# Patient Record
Sex: FEMALE | Race: BLACK OR AFRICAN AMERICAN | Employment: UNEMPLOYED | URBAN - METROPOLITAN AREA
[De-identification: names, ages, dates, MRNs, and addresses within clinical notes are randomized per-mention and may not be internally consistent; named-entity substitution may affect disease eponyms.]

---
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
## 2025-01-09 ENCOUNTER — HOSPITAL ENCOUNTER (EMERGENCY)
Facility: HOSPITAL | Age: 20
Discharge: HOME | End: 2025-01-09
Payer: COMMERCIAL

## 2025-01-09 VITALS
HEIGHT: 59 IN | TEMPERATURE: 98.2 F | SYSTOLIC BLOOD PRESSURE: 130 MMHG | HEART RATE: 76 BPM | DIASTOLIC BLOOD PRESSURE: 76 MMHG | WEIGHT: 120 LBS | OXYGEN SATURATION: 100 % | RESPIRATION RATE: 16 BRPM | BODY MASS INDEX: 24.19 KG/M2
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
## 2025-01-09 PROCEDURE — 99282 EMERGENCY DEPT VISIT SF MDM: CPT

## 2025-01-09 PROCEDURE — 2500000001 HC RX 250 WO HCPCS SELF ADMINISTERED DRUGS (ALT 637 FOR MEDICARE OP): Mod: SE

## 2025-01-09 PROCEDURE — 99284 EMERGENCY DEPT VISIT MOD MDM: CPT

## 2025-01-09 PROCEDURE — 99283 EMERGENCY DEPT VISIT LOW MDM: CPT

## 2025-01-09 RX ORDER — ACETAMINOPHEN 325 MG/1
650 TABLET ORAL ONCE
Status: COMPLETED | OUTPATIENT
Start: 2025-01-09 | End: 2025-01-09

## 2025-01-09 RX ORDER — IBUPROFEN 600 MG/1
600 TABLET ORAL ONCE
Status: COMPLETED | OUTPATIENT
Start: 2025-01-09 | End: 2025-01-09

## 2025-01-09 RX ADMIN — IBUPROFEN 600 MG: 600 TABLET, FILM COATED ORAL at 14:47

## 2025-01-09 RX ADMIN — ACETAMINOPHEN 650 MG: 325 TABLET ORAL at 14:47

## 2025-01-09 ASSESSMENT — COLUMBIA-SUICIDE SEVERITY RATING SCALE - C-SSRS
6. HAVE YOU EVER DONE ANYTHING, STARTED TO DO ANYTHING, OR PREPARED TO DO ANYTHING TO END YOUR LIFE?: NO
2. HAVE YOU ACTUALLY HAD ANY THOUGHTS OF KILLING YOURSELF?: NO
1. IN THE PAST MONTH, HAVE YOU WISHED YOU WERE DEAD OR WISHED YOU COULD GO TO SLEEP AND NOT WAKE UP?: NO
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
## 2025-01-09 NOTE — Clinical Note
Shannon Yu was seen and treated in our emergency department on 1/9/2025.  She may return to school on 01/13/2025.      If you have any questions or concerns, please don't hesitate to call.      Heaven Haddad PA-C

## 2025-01-09 NOTE — DISCHARGE INSTRUCTIONS
Your facial injuries are very minor and should heal up on their own very nicely throughout the coming week.  You can apply ice to the bruised/swollen areas a few times per day.  Put the ice on for 20 minutes followed by 20 minutes off.  In addition, you can take Tylenol or Motrin/Advil every 6 hours as needed for any kind of pain.

## 2025-01-13 ENCOUNTER — HOSPITAL ENCOUNTER (EMERGENCY)
Facility: HOSPITAL | Age: 20
Discharge: HOME | End: 2025-01-13
Payer: COMMERCIAL

## 2025-01-13 ENCOUNTER — CLINICAL SUPPORT (OUTPATIENT)
Dept: EMERGENCY MEDICINE | Facility: HOSPITAL | Age: 20
End: 2025-01-13
Payer: COMMERCIAL

## 2025-01-13 VITALS
BODY MASS INDEX: 23.59 KG/M2 | TEMPERATURE: 99.7 F | OXYGEN SATURATION: 99 % | HEART RATE: 99 BPM | RESPIRATION RATE: 18 BRPM | WEIGHT: 117 LBS | HEIGHT: 59 IN

## 2025-01-13 DIAGNOSIS — R68.89 FLU-LIKE SYMPTOMS: Primary | ICD-10-CM

## 2025-01-13 LAB
ATRIAL RATE: 103 BPM
FLUAV RNA RESP QL NAA+PROBE: DETECTED
FLUBV RNA RESP QL NAA+PROBE: NOT DETECTED
P AXIS: 69 DEGREES
P OFFSET: 204 MS
P ONSET: 167 MS
PR INTERVAL: 130 MS
Q ONSET: 222 MS
QRS COUNT: 17 BEATS
QRS DURATION: 70 MS
QT INTERVAL: 314 MS
QTC CALCULATION(BAZETT): 411 MS
QTC FREDERICIA: 376 MS
R AXIS: 85 DEGREES
RSV RNA RESP QL NAA+PROBE: NOT DETECTED
SARS-COV-2 RNA RESP QL NAA+PROBE: NOT DETECTED
T AXIS: -17 DEGREES
T OFFSET: 379 MS
VENTRICULAR RATE: 103 BPM

## 2025-01-13 PROCEDURE — 99284 EMERGENCY DEPT VISIT MOD MDM: CPT

## 2025-01-13 PROCEDURE — 93005 ELECTROCARDIOGRAM TRACING: CPT

## 2025-01-13 PROCEDURE — 87637 SARSCOV2&INF A&B&RSV AMP PRB: CPT

## 2025-01-13 PROCEDURE — 2500000001 HC RX 250 WO HCPCS SELF ADMINISTERED DRUGS (ALT 637 FOR MEDICARE OP): Mod: SE

## 2025-01-13 RX ORDER — IBUPROFEN 400 MG/1
400 TABLET ORAL ONCE
Status: COMPLETED | OUTPATIENT
Start: 2025-01-13 | End: 2025-01-13

## 2025-01-13 RX ORDER — GUAIFENESIN 600 MG/1
1200 TABLET, EXTENDED RELEASE ORAL 2 TIMES DAILY
Qty: 16 TABLET | Refills: 0 | Status: SHIPPED | OUTPATIENT
Start: 2025-01-13 | End: 2025-01-17

## 2025-01-13 RX ORDER — AMOXICILLIN 500 MG/1
1000 CAPSULE ORAL 2 TIMES DAILY
Qty: 21 CAPSULE | Refills: 0 | Status: SHIPPED | OUTPATIENT
Start: 2025-01-13 | End: 2025-01-20

## 2025-01-13 RX ORDER — AMOXICILLIN 250 MG/1
1000 CAPSULE ORAL ONCE
Status: COMPLETED | OUTPATIENT
Start: 2025-01-13 | End: 2025-01-13

## 2025-01-13 RX ORDER — IBUPROFEN 600 MG/1
600 TABLET ORAL EVERY 6 HOURS PRN
Qty: 16 TABLET | Refills: 0 | Status: SHIPPED | OUTPATIENT
Start: 2025-01-13 | End: 2025-01-17

## 2025-01-13 RX ORDER — ACETAMINOPHEN 325 MG/1
650 TABLET ORAL EVERY 6 HOURS PRN
Qty: 20 TABLET | Refills: 0 | Status: SHIPPED | OUTPATIENT
Start: 2025-01-13 | End: 2025-01-18

## 2025-01-13 RX ORDER — ACETAMINOPHEN 325 MG/1
975 TABLET ORAL ONCE
Status: COMPLETED | OUTPATIENT
Start: 2025-01-13 | End: 2025-01-13

## 2025-01-13 RX ADMIN — ACETAMINOPHEN 975 MG: 325 TABLET, FILM COATED ORAL at 21:32

## 2025-01-13 RX ADMIN — IBUPROFEN 400 MG: 400 TABLET, FILM COATED ORAL at 21:33

## 2025-01-13 RX ADMIN — AMOXICILLIN 1000 MG: 250 CAPSULE ORAL at 21:33

## 2025-01-13 ASSESSMENT — PAIN DESCRIPTION - PAIN TYPE: TYPE: ACUTE PAIN

## 2025-01-13 ASSESSMENT — PAIN SCALES - GENERAL: PAINLEVEL_OUTOF10: 5 - MODERATE PAIN

## 2025-01-13 ASSESSMENT — LIFESTYLE VARIABLES
TOTAL SCORE: 0
HAVE YOU EVER FELT YOU SHOULD CUT DOWN ON YOUR DRINKING: NO
EVER FELT BAD OR GUILTY ABOUT YOUR DRINKING: NO
HAVE PEOPLE ANNOYED YOU BY CRITICIZING YOUR DRINKING: NO
EVER HAD A DRINK FIRST THING IN THE MORNING TO STEADY YOUR NERVES TO GET RID OF A HANGOVER: NO

## 2025-01-13 ASSESSMENT — PAIN DESCRIPTION - FREQUENCY: FREQUENCY: CONSTANT/CONTINUOUS

## 2025-01-13 ASSESSMENT — PAIN DESCRIPTION - DESCRIPTORS: DESCRIPTORS: ACHING

## 2025-01-13 ASSESSMENT — PAIN DESCRIPTION - LOCATION: LOCATION: BACK

## 2025-01-13 ASSESSMENT — PAIN - FUNCTIONAL ASSESSMENT: PAIN_FUNCTIONAL_ASSESSMENT: 0-10

## 2025-01-13 NOTE — Clinical Note
Shannon Yu was seen and treated in our emergency department on 1/13/2025.  She may return to school on 01/16/2025.      If you have any questions or concerns, please don't hesitate to call.      Tim Franco, APRN-CNP

## 2025-01-14 ENCOUNTER — TELEPHONE (OUTPATIENT)
Dept: PHARMACY | Facility: HOSPITAL | Age: 20
End: 2025-01-14
Payer: COMMERCIAL

## 2025-01-14 NOTE — PROGRESS NOTES
EDPD Note: Rapid Result Review    I reviewed Shannon Yu 's chart regarding a positive Flu A culture/result that was taken during their recent emergency room visit. The patient was not told about these results prior to leaving the emergency department. Therefore, patient was contacted and given appropriate education.     Pt presented to ED 01/13 for flu-like sx and right ear pain. Denied N/V or changes in urination or bowel movement. Reported cough with sputum, chills, and myalgia. Pt discharged with Amoxil for sinusitis. Spoke with pt - reviewed her positive Flu results. Pt reports doing better, counseled on supportive care treatments, and addressed any questions or concerns.     No results found for the last 90 days.    Admission on 01/13/2025, Discharged on 01/13/2025   Component Date Value Ref Range Status    Ventricular Rate 01/13/2025 103  BPM Final    Atrial Rate 01/13/2025 103  BPM Final    AZ Interval 01/13/2025 130  ms Final    QRS Duration 01/13/2025 70  ms Final    QT Interval 01/13/2025 314  ms Final    QTC Calculation(Bazett) 01/13/2025 411  ms Final    P Axis 01/13/2025 69  degrees Final    R Axis 01/13/2025 85  degrees Final    T Axis 01/13/2025 -17  degrees Final    QRS Count 01/13/2025 17  beats Final    Q Onset 01/13/2025 222  ms Final    P Onset 01/13/2025 167  ms Final    P Offset 01/13/2025 204  ms Final    T Offset 01/13/2025 379  ms Final    QTC Fredericia 01/13/2025 376  ms Final    Flu A Result 01/13/2025 Detected (A)  Not Detected Final    Flu B Result 01/13/2025 Not Detected  Not Detected Final    Coronavirus 2019, PCR 01/13/2025 Not Detected  Not Detected Final    RSV PCR 01/13/2025 Not Detected  Not Detected Final       No further follow up needed from EDPD Team.     If there are any other questions for the ED Post-Discharge Culture Follow Up Team, please contact [phone]. Fax: [phone].    Ángel DavisonD

## 2025-01-14 NOTE — ED TRIAGE NOTES
Pt arrived to ED reporting a nonproductive cough, fever and chills, chest discomfort that gets worst when she coughs, pain in her back and generalized body aches and pain

## 2025-01-14 NOTE — ED PROVIDER NOTES
HPI   Chief Complaint   Patient presents with    Flu Symptoms       19-year-old female presents for chief complaint of flulike symptoms and right ear pain.  Ongoing for 2 days.  Denies injury.  Denies chest pain or dyspnea.  Endorses cough with yellow sputum as well as chills and myalgia.  Denies any known sick contacts but does go to school.  Denies any dizziness or vision changes.  No nausea or vomiting.  No changes in urination or bowel movement.              Patient History   Past Medical History:   Diagnosis Date    Acute appendicitis with localized peritonitis, without perforation or gangrene 04/14/2022    Acute appendicitis with localized peritonitis    Gonorrhea 10/29/2023    Nexplanon in place 10/29/2023     Past Surgical History:   Procedure Laterality Date    OTHER SURGICAL HISTORY  04/14/2022    Appendectomy laparoscopic     Family History   Problem Relation Name Age of Onset    Depression Mother      Sickle cell trait Father      Sickle cell trait Sister      ADD / ADHD Sister       Social History     Tobacco Use    Smoking status: Every Day     Types: Cigarettes, Cigars    Smokeless tobacco: Never   Substance Use Topics    Alcohol use: Not Currently    Drug use: Never       Physical Exam   ED Triage Vitals [01/13/25 1951]   Temperature Heart Rate Respirations BP   37.6 °C (99.7 °F) 99 18 --      Pulse Ox Temp Source Heart Rate Source Patient Position   99 % Oral Monitor Sitting      BP Location FiO2 (%)     Left arm --       Physical Exam  Vitals and nursing note reviewed.   Constitutional:       General: She is not in acute distress.     Appearance: She is well-developed.   HENT:      Head: Normocephalic and atraumatic.      Right Ear: Hearing normal. Tympanic membrane is injected.      Left Ear: Hearing and tympanic membrane normal.   Eyes:      Conjunctiva/sclera: Conjunctivae normal.   Cardiovascular:      Rate and Rhythm: Normal rate and regular rhythm.      Heart sounds: No murmur  heard.  Pulmonary:      Effort: Pulmonary effort is normal. No respiratory distress.      Breath sounds: Normal breath sounds.   Abdominal:      Palpations: Abdomen is soft.      Tenderness: There is no abdominal tenderness.   Musculoskeletal:         General: No swelling.      Cervical back: Neck supple.   Skin:     General: Skin is warm and dry.      Capillary Refill: Capillary refill takes less than 2 seconds.   Neurological:      Mental Status: She is alert.   Psychiatric:         Mood and Affect: Mood normal.           ED Course & MDM   Diagnoses as of 01/13/25 2127   Flu-like symptoms                 No data recorded     [place] Coma Scale Score: 15 (01/13/25 1955 : Adela Singh RN)                           Medical Decision Making  Vital signs reviewed, unremarkable at this time.  Patient is well-appearing and in no apparent distress.  Speaks full sentences without difficulty.  Diagnostic testing performed with COVID, flu, RSV swab.  Will be sent home without the results and advised to follow-up on Actiwave anderson.  We treated for right otitis media with amoxicillin.  Also Tylenol Motrin for chills and myalgia.  Likely due to viral URI with fever.  Advised to take all meds as directed and to get some rest and hydrate well.  Work note/school note given.  Advised to return to the ED with any new or worsening symptoms and to follow-up with primary care.  Patient did agree with this plan.  Discharged in stable condition.        Procedure  Procedures     CLEMENTE Dye-ROSEMARY  01/13/25 2129

## 2025-02-20 ENCOUNTER — APPOINTMENT (OUTPATIENT)
Dept: PEDIATRICS | Facility: CLINIC | Age: 20
End: 2025-02-20
Payer: COMMERCIAL

## 2025-02-27 ENCOUNTER — OFFICE VISIT (OUTPATIENT)
Dept: PEDIATRICS | Facility: CLINIC | Age: 20
End: 2025-02-27
Payer: COMMERCIAL

## 2025-02-27 VITALS
BODY MASS INDEX: 24.62 KG/M2 | HEIGHT: 59 IN | SYSTOLIC BLOOD PRESSURE: 105 MMHG | DIASTOLIC BLOOD PRESSURE: 64 MMHG | RESPIRATION RATE: 16 BRPM | TEMPERATURE: 98.1 F | HEART RATE: 75 BPM | WEIGHT: 122.14 LBS

## 2025-02-27 DIAGNOSIS — Z00.00 ROUTINE GENERAL MEDICAL EXAMINATION AT A HEALTH CARE FACILITY: Primary | ICD-10-CM

## 2025-02-27 DIAGNOSIS — L85.3 DRY SKIN: ICD-10-CM

## 2025-02-27 DIAGNOSIS — Z30.011 ENCOUNTER FOR INITIAL PRESCRIPTION OF CONTRACEPTIVE PILLS: ICD-10-CM

## 2025-02-27 DIAGNOSIS — Z72.51 UNPROTECTED SEX: ICD-10-CM

## 2025-02-27 DIAGNOSIS — Z13.220 NEED FOR LIPID SCREENING: ICD-10-CM

## 2025-02-27 PROCEDURE — 99213 OFFICE O/P EST LOW 20 MIN: CPT | Mod: 25 | Performed by: PEDIATRICS

## 2025-02-27 PROCEDURE — 99395 PREV VISIT EST AGE 18-39: CPT | Mod: GC | Performed by: PEDIATRICS

## 2025-02-27 RX ORDER — PNV NO.95/FERROUS FUM/FOLIC AC 28MG-0.8MG
1 TABLET ORAL DAILY
Qty: 30 TABLET | Refills: 3 | Status: SHIPPED | OUTPATIENT
Start: 2025-02-27

## 2025-02-27 ASSESSMENT — ENCOUNTER SYMPTOMS
ABDOMINAL PAIN: 0
NAUSEA: 0
COUGH: 0
HEADACHES: 0
RHINORRHEA: 0
FEVER: 0
ROS SKIN COMMENTS: DRY SKIN
DIARRHEA: 0
SHORTNESS OF BREATH: 0
CONSTIPATION: 0
FATIGUE: 0

## 2025-02-27 ASSESSMENT — PAIN SCALES - GENERAL: PAINLEVEL_OUTOF10: 0-NO PAIN

## 2025-02-27 NOTE — PROGRESS NOTES
Assessment/Plan   Shannon is a 19 y.o. female with unremarkable past medical history who presents for well check.  Shannon is generally healthy with normal weight.     Diagnoses and all orders for this visit:  Need for lipid screening  -     Lipid Panel Non-Fasting; Future  Unprotected sex  -     prenatal vitamin, iron-folic, (Prenatal) 27 mg iron-800 mcg folic acid tablet; Take 1 tablet by mouth once daily.  Encounter for well child check without abnormal findings  Dry skin  -     mineral oil-hydrophilic petrolatum (Aquaphor) ointment; Apply topically if needed for dry skin.  -     CBC and Auto Differential; Future  -     Vitamin D 25-Hydroxy,Total (for eval of Vitamin D levels); Future  Encounter for initial prescription of contraceptive pills  -     condoms - male misc; 1 Units if needed (with intercourse).  Other orders  -     Follow Up In Pediatrics      #Health Maintenance:  -Immunizations: up to date; declined covid / influenza   BP: WNL   , 0-4: no depression  ASQ: NEGATIVE   - screening lipids, CBC, and vitamin D: history of vitamin D deficiency will send treatment dose if deficient     #History of STD:   - reports recent testing at urgent care, negative  - declined pregnancy testing or STD screening  - offered contraceptive medications, declined  - prescription sent for condoms and prenatal vitamins     Subjective   Patient ID: Shannon Yu is a 19 y.o. female who presents for Follow-up.    HPI  Shannon is a 19 year old female presenting for follow up visit. As Her last well visit was last year, will perform today as well.    No health concerns. She would like a refill of her prenatal vitamin. She was prescribed it last time, due to having sex without concsistent condom use. She wants to use it now for that reason, as well as hair growth. She is not interested in contraceptive medication. She denies vaginal discharge, pain, or itching.     She also complains of dry skin. Has used aquaphor in past.      Menstrual cycle comes monthly. No cramping or excessive bleeding.     Well Child Assessment:    Elimination  Elimination problems do not include constipation or diarrhea.        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   HENT:  Negative for congestion and rhinorrhea.    Respiratory:  Negative for cough and shortness of breath.    Cardiovascular:  Negative for chest pain.   Gastrointestinal:  Negative for abdominal pain, constipation, diarrhea and nausea.   Genitourinary:  Negative for pelvic pain, vaginal discharge and vaginal pain.   Skin:         Dry skin   Neurological:  Negative for headaches.     Objective   Vitals:    02/27/25 1044   BP: 105/64   Pulse: 75   Resp: 16   Temp: 36.7 °C (98.1 °F)        Physical Exam  Constitutional:       General: She is not in acute distress.     Appearance: Normal appearance.   HENT:      Head: Normocephalic and atraumatic.      Nose: Nose normal.      Mouth/Throat:      Mouth: Mucous membranes are moist.   Eyes:      Extraocular Movements: Extraocular movements intact.      Conjunctiva/sclera: Conjunctivae normal.   Neck:      Vascular: No carotid bruit.   Cardiovascular:      Rate and Rhythm: Normal rate and regular rhythm.      Pulses: Normal pulses.      Heart sounds: Normal heart sounds.    Pulmonary:      Effort: Pulmonary effort is normal. No respiratory distress.      Breath sounds: Normal breath sounds.   Abdominal:      General: Abdomen is flat. There is no distension.      Palpations: Abdomen is soft.      Tenderness: There is no abdominal tenderness.   Genitourinary:     Comments: Declined  exam  Musculoskeletal:         General: No swelling or deformity. Normal range of motion.      Cervical back: Normal range of motion and neck supple.   Lymphadenopathy:      Cervical: No cervical adenopathy.   Skin:     General: Skin is warm and dry.      Capillary Refill: Capillary refill takes less than 2 seconds.      Comments: Xerosis of shoulders, no obvious eczematous rashes   Neurological:      General: No focal deficit present.      Mental Status: She is alert and oriented to person, place, and time.   Psychiatric:         Mood and Affect: Mood normal.       Elissa Blue MD

## 2025-02-27 NOTE — PATIENT INSTRUCTIONS
It was a pleasure seeing you Chenise! Your prenatal vitamin and aquaphor as sent to the pharmacy. We would like to check some blood work (to check your lipids, if you have anemia), we will check your vitamin D and will send a prescription if deficient.

## 2025-02-28 DIAGNOSIS — D50.9 MICROCYTIC ANEMIA: ICD-10-CM

## 2025-02-28 DIAGNOSIS — E55.9 VITAMIN D DEFICIENCY: Primary | ICD-10-CM

## 2025-02-28 LAB
25(OH)D3+25(OH)D2 SERPL-MCNC: 11 NG/ML (ref 30–100)
BASOPHILS # BLD AUTO: 40 CELLS/UL (ref 0–200)
BASOPHILS NFR BLD AUTO: 1.1 %
CHOLEST SERPL-MCNC: 136 MG/DL
CHOLEST/HDLC SERPL: 2.1 (CALC)
EOSINOPHIL # BLD AUTO: 360 CELLS/UL (ref 15–500)
EOSINOPHIL NFR BLD AUTO: 10 %
ERYTHROCYTE [DISTWIDTH] IN BLOOD BY AUTOMATED COUNT: 15.4 % (ref 11–15)
HCT VFR BLD AUTO: 34.6 % (ref 35–45)
HDLC SERPL-MCNC: 66 MG/DL
HGB BLD-MCNC: 10.8 G/DL (ref 11.7–15.5)
LDLC SERPL CALC-MCNC: 57 MG/DL (CALC)
LYMPHOCYTES # BLD AUTO: 1415 CELLS/UL (ref 850–3900)
LYMPHOCYTES NFR BLD AUTO: 39.3 %
MCH RBC QN AUTO: 23.1 PG (ref 27–33)
MCHC RBC AUTO-ENTMCNC: 31.2 G/DL (ref 32–36)
MCV RBC AUTO: 74.1 FL (ref 80–100)
MONOCYTES # BLD AUTO: 209 CELLS/UL (ref 200–950)
MONOCYTES NFR BLD AUTO: 5.8 %
NEUTROPHILS # BLD AUTO: 1577 CELLS/UL (ref 1500–7800)
NEUTROPHILS NFR BLD AUTO: 43.8 %
NONHDLC SERPL-MCNC: 70 MG/DL (CALC)
PLATELET # BLD AUTO: 242 THOUSAND/UL (ref 140–400)
PMV BLD REES-ECKER: 12.2 FL (ref 7.5–12.5)
RBC # BLD AUTO: 4.67 MILLION/UL (ref 3.8–5.1)
TRIGL SERPL-MCNC: 47 MG/DL
WBC # BLD AUTO: 3.6 THOUSAND/UL (ref 3.8–10.8)

## 2025-02-28 RX ORDER — FERROUS SULFATE 324(65)MG
65 TABLET, DELAYED RELEASE (ENTERIC COATED) ORAL
Qty: 30 TABLET | Refills: 1 | Status: SHIPPED | OUTPATIENT
Start: 2025-02-28 | End: 2025-04-29

## 2025-02-28 RX ORDER — ERGOCALCIFEROL 1.25 MG/1
50000 CAPSULE ORAL
Qty: 6 CAPSULE | Refills: 0 | Status: SHIPPED | OUTPATIENT
Start: 2025-03-02

## 2025-03-03 DIAGNOSIS — D50.9 MICROCYTIC ANEMIA: ICD-10-CM

## 2025-03-03 RX ORDER — FERROUS SULFATE 324(65)MG
65 TABLET, DELAYED RELEASE (ENTERIC COATED) ORAL 2 TIMES DAILY
Qty: 60 TABLET | Refills: 1 | Status: SHIPPED | OUTPATIENT
Start: 2025-03-03 | End: 2025-05-02

## 2025-08-03 ENCOUNTER — HOSPITAL ENCOUNTER (EMERGENCY)
Facility: HOSPITAL | Age: 20
Discharge: HOME | End: 2025-08-03
Payer: COMMERCIAL

## 2025-08-03 VITALS
WEIGHT: 120 LBS | DIASTOLIC BLOOD PRESSURE: 68 MMHG | SYSTOLIC BLOOD PRESSURE: 109 MMHG | HEART RATE: 77 BPM | BODY MASS INDEX: 24.19 KG/M2 | HEIGHT: 59 IN | RESPIRATION RATE: 16 BRPM | TEMPERATURE: 97.2 F | OXYGEN SATURATION: 100 %

## 2025-08-03 DIAGNOSIS — Z32.02 NEGATIVE PREGNANCY TEST: ICD-10-CM

## 2025-08-03 DIAGNOSIS — Z11.3 SCREEN FOR STD (SEXUALLY TRANSMITTED DISEASE): Primary | ICD-10-CM

## 2025-08-03 LAB
APPEARANCE UR: ABNORMAL
BILIRUB UR STRIP.AUTO-MCNC: NEGATIVE MG/DL
CLUE CELLS SPEC QL WET PREP: PRESENT
COLOR UR: ABNORMAL
GLUCOSE UR STRIP.AUTO-MCNC: NORMAL MG/DL
KETONES UR STRIP.AUTO-MCNC: NEGATIVE MG/DL
LEUKOCYTE ESTERASE UR QL STRIP.AUTO: ABNORMAL
MUCOUS THREADS #/AREA URNS AUTO: NORMAL /LPF
NITRITE UR QL STRIP.AUTO: NEGATIVE
PH UR STRIP.AUTO: 6.5 [PH]
PREGNANCY TEST URINE, POC: NEGATIVE
PROT UR STRIP.AUTO-MCNC: NEGATIVE MG/DL
RBC # UR STRIP.AUTO: ABNORMAL MG/DL
RBC #/AREA URNS AUTO: NORMAL /HPF
SP GR UR STRIP.AUTO: 1.02
SQUAMOUS #/AREA URNS AUTO: NORMAL /HPF
T VAGINALIS SPEC QL WET PREP: ABNORMAL
TRICHOMONAS REFLEX COMMENT: ABNORMAL
UROBILINOGEN UR STRIP.AUTO-MCNC: NORMAL MG/DL
WBC #/AREA URNS AUTO: NORMAL /HPF
WBC VAG QL WET PREP: ABNORMAL
YEAST VAG QL WET PREP: ABNORMAL

## 2025-08-03 PROCEDURE — 87491 CHLMYD TRACH DNA AMP PROBE: CPT | Performed by: PHYSICIAN ASSISTANT

## 2025-08-03 PROCEDURE — 99284 EMERGENCY DEPT VISIT MOD MDM: CPT | Performed by: PHYSICIAN ASSISTANT

## 2025-08-03 PROCEDURE — 87661 TRICHOMONAS VAGINALIS AMPLIF: CPT | Performed by: PHYSICIAN ASSISTANT

## 2025-08-03 PROCEDURE — 87210 SMEAR WET MOUNT SALINE/INK: CPT | Performed by: PHYSICIAN ASSISTANT

## 2025-08-03 PROCEDURE — 81025 URINE PREGNANCY TEST: CPT | Performed by: PHYSICIAN ASSISTANT

## 2025-08-03 PROCEDURE — 99283 EMERGENCY DEPT VISIT LOW MDM: CPT

## 2025-08-03 PROCEDURE — 81001 URINALYSIS AUTO W/SCOPE: CPT | Performed by: PHYSICIAN ASSISTANT

## 2025-08-03 NOTE — Clinical Note
Shannon Yu was seen and treated in our emergency department on 8/3/2025.  She may return to work on 08/04/2025.       If you have any questions or concerns, please don't hesitate to call.      Dulce Escobedo PA-C

## 2025-08-04 ENCOUNTER — RESULTS FOLLOW-UP (OUTPATIENT)
Dept: PHARMACY | Facility: HOSPITAL | Age: 20
End: 2025-08-04
Payer: COMMERCIAL

## 2025-08-04 DIAGNOSIS — B96.89 BACTERIAL VAGINOSIS: Primary | ICD-10-CM

## 2025-08-04 DIAGNOSIS — N76.0 BACTERIAL VAGINOSIS: Primary | ICD-10-CM

## 2025-08-04 LAB
C TRACH RRNA SPEC QL NAA+PROBE: NEGATIVE
HOLD SPECIMEN: NORMAL
N GONORRHOEA DNA SPEC QL PROBE+SIG AMP: NEGATIVE
T VAGINALIS RRNA SPEC QL NAA+PROBE: NEGATIVE

## 2025-08-04 RX ORDER — METRONIDAZOLE 500 MG/1
500 TABLET ORAL 2 TIMES DAILY
Qty: 14 TABLET | Refills: 0 | Status: SHIPPED | OUTPATIENT
Start: 2025-08-04 | End: 2025-08-11

## 2025-08-04 NOTE — PROGRESS NOTES
EDPD Note: Initiation     Contacted Shirleymartin STANTON Yu regarding a positive bacterial vaginosis culture/result that was taken during their recent emergency room visit. I completed education with patient. The patient is not being treated appropriately.    Patient presented to the ED for STI test. Discharged with no antibiotics. Patient was encouraged to abstain from sexual intercourse for at least 7-14 days or after completion of treatment.  Patient is aware that sex partners do not need to be tested or treated. Patient made aware that if they experience any signs/symptoms to go to ED for further evaluation. Made aware to follow up with PCP. Patient verbalized understanding and had no further questions or concerns. Patient is aware that gonorrhea, chlamydia, and trichomonas tests are pending at time of outreach, and she will receive another call if they are positive.     The following prescription was sent to the patient's preferred pharmacy. No further follow up needed from EDPD Team.     Drug: Flagyl 500mg  Sig: BID x7  Qty: 14  Days Supply: 7    Trichomonas Wet Prep Reflex to PCR  Order: [id number]   Status: Final result    Test Result Released: Yes (seen)    Component  Ref Range & Units 1 d ago  (8/3/25) 8 mo ago  (11/18/24) 1 yr ago  (7/18/24) 2 yr ago  (2/7/23) 2 yr ago  (9/6/22) 3 yr ago  (7/21/22) 3 yr ago  (3/14/22)   Trichomonas  None Seen None Seen None Seen None Seen NONE SEEN R NONE SEEN R NONE SEEN R NONE SEEN R   Clue Cells  None Seen Present Abnormal  Present Abnormal  Present Abnormal  NONE SEEN R NONE SEEN R NONE SEEN R PRESENT Abnormal  R   Yeast  None Seen None Seen None Seen None Seen NONE SEEN R NONE SEEN R NONE SEEN R NONE SEEN R   WBC 3-8 1-2 1-2 1-2 R 9-20 R 1-2 R 21-50 R   Trichomonas reflex comment Trichomonas was not seen by wet prep. Reflex Trichomonas vaginalis by Amplified Detection.  Trichomonas was not seen by wet prep. Reflex Trichomonas vaginalis by Amplified Detection. SEE COMMENT CM  SEE COMMENT CM SEE COMMENT CM SEE COMMENT CM   Resulting Agency [facility]        Specimen Collected: 08/03/25 20:30 Last Resulted: 08/03/25 21:11         If there are any other questions for the ED Post-Discharge Culture Follow Up Team, please contact [phone]. Fax: [phone].    Juliana Moore, ÁngelD

## 2025-08-04 NOTE — ED PROVIDER NOTES
"Emergency Department Encounter  [facility] (formerly [facility]3] EMERGENCY MEDICINE    Patient: Shannon Yu  MRN: [id number]  : 2005  Date of Evaluation: 8/3/2025  ED Provider: Dulce Escobedo PA-C        History of Present Illness     This is a 19-year-old healthy female who presents to the ED for STD screening and a pregnancy test.  Vital stable upon arrival to the ED.  She denies any symptoms or complaints at this time, specifically denies vaginal discharge, vaginal pain, pelvic pain, abdominal pain, vaginal odor, urinary symptoms, fevers, chills, nausea, vomiting, or diarrhea.  Her first day of her last menstrual cycle was .  She is sexually active with 1 male partner and does not use protection.  She denies being pregnant ever previously.  She states that she just wanted to be screened for STDs.  She denies any known exposures.      History provided by:  Patient   used: No             Visit Vitals  /68   Pulse 77   Temp 36.2 °C (97.2 °F)   Resp 16   Ht (!) 1.499 m (4' 11\")   Wt 54.4 kg (120 lb)   SpO2 100%   BMI 24.24 kg/m²   OB Status Having periods   Smoking Status Never   BSA 1.51 m²          Physical Exam       Triage vitals:  T 36.2 °C (97.2 °F)  HR 77  /68  RR 16  O2 100 % None (Room air)    Physical Exam     Physical exam:   General: Vitals noted, no distress. Afebrile.   EENT:  Hearing grossly intact. Normal phonation. MMM. Airway patient. PERRL. EOMI.   Cardiac: Regular, rate, rhythm. Normal S1 and S2.  No murmurs, gallops, rubs.   Pulmonary: Good air exchange. Lungs clear bilaterally. No wheezes, rhonchi, rales. No accessory muscle use.   Abdomen: Soft, nonsurgical. Nontender. No peritoneal signs.   Extremities: No peripheral edema.  Full range of motion. Moves all extremities freely. No tenderness throughout extremities.   Skin: No rash. Warm and Dry.   Neuro: No focal neurologic deficits. CN 2-12 grossly intact. Sensation equal bilaterally. No " weakness.       Results       Labs Reviewed   POCT PREGNANCY, URINE - Normal       Result Value    Preg Test, Ur Negative     URINALYSIS WITH REFLEX MICROSCOPIC   TRICHOMONAS WET PREP REFLEX TO PCR   C. TRACHOMATIS / N. GONORRHOEAE, AMPLIFIED, UROGENITAL       No orders to display         Medical Decision Making & ED Course         ED Course & [facility]     Medical Decision Making  This is a 19-year-old healthy female who presents to the ED for STD screening and pregnancy test.  Vital stable upon arrival to the ED.  On examination she is overall well-appearing.  She denies any symptoms.  Abdomen soft and nontender, no CVA tenderness.  Wet prep as well as GC and chlamydia swabs obtained via self swab.  Very low clinical suspicion for PID/TOA as patient has no abdominal pain or vaginal symptoms at this time.  Urine pregnancy test obtained and was negative.  UA ordered.  Patient did not want to wait for her UA/wet prep results and elected to be discharged home to follow-up on these results as an outpatient.  She was told that she would receive a phone call with any positive results on her STD screening.  She was given signs and symptoms that she should return to the ED with.  She was advised to follow-up with her primary care provider/OB/GYN as an outpatient as needed.         Diagnoses as of 08/03/25 2040   Screen for STD (sexually transmitted disease)   Negative pregnancy test                  Disposition   As a result of the work-up, the patient was discharged home.  she was informed of her diagnosis and instructed to come back with any concerns or worsening of condition.  she and was agreeable to the plan as discussed above.  she was given the opportunity to ask questions.  All of the patient's questions were answered.    Procedures     Patient was seen independently    Procedures    Dulce Escobedo PA-C  Emergency Medicine      Dulce Escobedo PA-C  08/03/25 2040

## 2025-08-18 ENCOUNTER — TELEPHONE (OUTPATIENT)
Dept: PEDIATRICS | Facility: CLINIC | Age: 20
End: 2025-08-18
Payer: COMMERCIAL

## 2025-08-19 ENCOUNTER — HOSPITAL ENCOUNTER (EMERGENCY)
Facility: HOSPITAL | Age: 20
Discharge: HOME | End: 2025-08-19
Payer: COMMERCIAL

## 2025-08-19 VITALS
HEART RATE: 94 BPM | DIASTOLIC BLOOD PRESSURE: 91 MMHG | OXYGEN SATURATION: 99 % | RESPIRATION RATE: 16 BRPM | WEIGHT: 120 LBS | BODY MASS INDEX: 24.19 KG/M2 | HEIGHT: 59 IN | TEMPERATURE: 97.5 F | SYSTOLIC BLOOD PRESSURE: 131 MMHG

## 2025-08-19 DIAGNOSIS — N39.0 URINARY TRACT INFECTION WITH HEMATURIA, SITE UNSPECIFIED: ICD-10-CM

## 2025-08-19 DIAGNOSIS — B37.9 YEAST INFECTION: Primary | ICD-10-CM

## 2025-08-19 DIAGNOSIS — R31.9 URINARY TRACT INFECTION WITH HEMATURIA, SITE UNSPECIFIED: ICD-10-CM

## 2025-08-19 LAB
APPEARANCE UR: ABNORMAL
BILIRUB UR STRIP.AUTO-MCNC: NEGATIVE MG/DL
CLUE CELLS SPEC QL WET PREP: ABNORMAL
COLOR UR: COLORLESS
GLUCOSE UR STRIP.AUTO-MCNC: NORMAL MG/DL
KETONES UR STRIP.AUTO-MCNC: NEGATIVE MG/DL
LEUKOCYTE ESTERASE UR QL STRIP.AUTO: ABNORMAL
NITRITE UR QL STRIP.AUTO: NEGATIVE
PH UR STRIP.AUTO: 7.5 [PH]
PREGNANCY TEST URINE, POC: NEGATIVE
PROT UR STRIP.AUTO-MCNC: ABNORMAL MG/DL
RBC # UR STRIP.AUTO: ABNORMAL MG/DL
RBC #/AREA URNS AUTO: >20 /HPF
SP GR UR STRIP.AUTO: 1.02
SQUAMOUS #/AREA URNS AUTO: ABNORMAL /HPF
T VAGINALIS SPEC QL WET PREP: ABNORMAL
TRICHOMONAS REFLEX COMMENT: ABNORMAL
UROBILINOGEN UR STRIP.AUTO-MCNC: NORMAL MG/DL
WBC #/AREA URNS AUTO: >50 /HPF
WBC VAG QL WET PREP: >50
YEAST VAG QL WET PREP: PRESENT

## 2025-08-19 PROCEDURE — 99283 EMERGENCY DEPT VISIT LOW MDM: CPT

## 2025-08-19 PROCEDURE — 81001 URINALYSIS AUTO W/SCOPE: CPT

## 2025-08-19 PROCEDURE — 87210 SMEAR WET MOUNT SALINE/INK: CPT | Mod: 59

## 2025-08-19 PROCEDURE — 87661 TRICHOMONAS VAGINALIS AMPLIF: CPT

## 2025-08-19 PROCEDURE — 81025 URINE PREGNANCY TEST: CPT

## 2025-08-19 PROCEDURE — 99284 EMERGENCY DEPT VISIT MOD MDM: CPT

## 2025-08-19 PROCEDURE — 2500000001 HC RX 250 WO HCPCS SELF ADMINISTERED DRUGS (ALT 637 FOR MEDICARE OP): Mod: SE

## 2025-08-19 RX ORDER — NITROFURANTOIN 25; 75 MG/1; MG/1
100 CAPSULE ORAL 2 TIMES DAILY
Qty: 10 CAPSULE | Refills: 0 | Status: SHIPPED | OUTPATIENT
Start: 2025-08-19 | End: 2025-08-24

## 2025-08-19 RX ORDER — FLUCONAZOLE 150 MG/1
150 TABLET ORAL ONCE
Status: COMPLETED | OUTPATIENT
Start: 2025-08-19 | End: 2025-08-19

## 2025-08-19 RX ORDER — FLUCONAZOLE 150 MG/1
150 TABLET ORAL ONCE
Qty: 1 TABLET | Refills: 0 | Status: SHIPPED | OUTPATIENT
Start: 2025-08-19 | End: 2025-08-19

## 2025-08-19 RX ADMIN — FLUCONAZOLE 150 MG: 150 TABLET ORAL at 16:16

## 2025-08-19 ASSESSMENT — PAIN SCALES - GENERAL: PAINLEVEL_OUTOF10: 10 - WORST POSSIBLE PAIN

## 2025-08-19 ASSESSMENT — PAIN DESCRIPTION - DESCRIPTORS: DESCRIPTORS: DISCOMFORT

## 2025-08-19 ASSESSMENT — PAIN - FUNCTIONAL ASSESSMENT: PAIN_FUNCTIONAL_ASSESSMENT: 0-10

## 2025-08-20 ENCOUNTER — TELEPHONE (OUTPATIENT)
Dept: OBSTETRICS AND GYNECOLOGY | Facility: CLINIC | Age: 20
End: 2025-08-20
Payer: COMMERCIAL

## 2025-08-20 LAB
HOLD SPECIMEN: NORMAL
T VAGINALIS RRNA SPEC QL NAA+PROBE: NEGATIVE